# Patient Record
Sex: FEMALE | Race: WHITE | NOT HISPANIC OR LATINO
[De-identification: names, ages, dates, MRNs, and addresses within clinical notes are randomized per-mention and may not be internally consistent; named-entity substitution may affect disease eponyms.]

---

## 2021-10-26 ENCOUNTER — TRANSCRIPTION ENCOUNTER (OUTPATIENT)
Age: 51
End: 2021-10-26

## 2022-07-02 ENCOUNTER — NON-APPOINTMENT (OUTPATIENT)
Age: 52
End: 2022-07-02

## 2022-10-20 ENCOUNTER — NON-APPOINTMENT (OUTPATIENT)
Age: 52
End: 2022-10-20

## 2022-10-24 ENCOUNTER — APPOINTMENT (OUTPATIENT)
Dept: ORTHOPEDIC SURGERY | Facility: CLINIC | Age: 52
End: 2022-10-24
Payer: COMMERCIAL

## 2022-10-24 VITALS — BODY MASS INDEX: 36.37 KG/M2 | WEIGHT: 240 LBS | HEIGHT: 68 IN

## 2022-10-24 DIAGNOSIS — Z78.9 OTHER SPECIFIED HEALTH STATUS: ICD-10-CM

## 2022-10-24 DIAGNOSIS — M65.4 RADIAL STYLOID TENOSYNOVITIS [DE QUERVAIN]: ICD-10-CM

## 2022-10-24 PROCEDURE — 99202 OFFICE O/P NEW SF 15 MIN: CPT

## 2022-10-25 PROBLEM — M65.4 DE QUERVAIN'S DISEASE (RADIAL STYLOID TENOSYNOVITIS): Status: ACTIVE | Noted: 2022-10-25

## 2022-10-25 NOTE — PHYSICAL EXAM
[de-identified] : R wrist\par Mild swelling\par Tender first dorsal comp\par Decreased thumb and wrist ROM\par +Finklesteins

## 2022-10-25 NOTE — HISTORY OF PRESENT ILLNESS
[9] : 9 [4] : 4 [Dull/Aching] : dull/aching [Tingling] : tingling [Ice] : ice [de-identified] : R radial sided wrist pain is better\par She got brace at urgent care which helps [] : no [FreeTextEntry1] : r hand [FreeTextEntry3] : 2 weeks ago [FreeTextEntry5] : woke up in pain and swelling. can not make fist. denies injury\par has N/T. in wrist brace\par wakes her up [de-identified] : sleep [de-identified] : 10/21/22 [de-identified] : [facility]

## 2022-11-19 ENCOUNTER — NON-APPOINTMENT (OUTPATIENT)
Age: 52
End: 2022-11-19

## 2023-01-12 ENCOUNTER — NON-APPOINTMENT (OUTPATIENT)
Age: 53
End: 2023-01-12

## 2024-01-30 ENCOUNTER — NON-APPOINTMENT (OUTPATIENT)
Age: 54
End: 2024-01-30